# Patient Record
Sex: MALE | Race: WHITE | Employment: OTHER | URBAN - METROPOLITAN AREA
[De-identification: names, ages, dates, MRNs, and addresses within clinical notes are randomized per-mention and may not be internally consistent; named-entity substitution may affect disease eponyms.]

---

## 2019-03-28 ENCOUNTER — TRANSFERRED RECORDS (OUTPATIENT)
Dept: HEALTH INFORMATION MANAGEMENT | Facility: CLINIC | Age: 84
End: 2019-03-28

## 2019-04-04 ENCOUNTER — TELEPHONE (OUTPATIENT)
Dept: OTHER | Facility: CLINIC | Age: 84
End: 2019-04-04

## 2019-04-04 DIAGNOSIS — R09.89 DIMINISHED PULSES IN LOWER EXTREMITY: Primary | ICD-10-CM

## 2019-04-04 NOTE — TELEPHONE ENCOUNTER
Reason for call:  Symptom   Symptom or request: Referral    Duration (how long have symptoms been present): several months  Have you been treated for this before? Yes    Additional comments: Patient called to schedule a consult with Dr. Marie before surgery on his legs at [facility] to evaluate blood flow.    Phone number to reach patient:  Home number on file [phone] (home)    Best Time:  Any    Can we leave a detailed message on this number?  YES

## 2019-04-05 NOTE — TELEPHONE ENCOUNTER
Contacted pt to discuss further.  Pt is seeing Dr. Rodriguez at [facility] for ankle pain.  Pt denies open wounds, calf pain with ambulation.  Will discuss with Dr. Marie if he would like KANG prior to consult or assess clinically with doppler.  Requested recent office notes from Dr. Rodriguez at [facility].  Clementine Bazan, ELAINAN, RN

## 2019-04-09 NOTE — TELEPHONE ENCOUNTER
Office notes received and reviewed with Dr. Marie.  He would like pt to obtain KANG w/ exercise prior to consult on 4/18/19.  Order entered.  Will route to scheduling to coordinate.  ELAINA LopezN, RN

## 2019-04-18 ENCOUNTER — OFFICE VISIT (OUTPATIENT)
Dept: OTHER | Facility: CLINIC | Age: 84
End: 2019-04-18
Attending: SURGERY
Payer: MEDICARE

## 2019-04-18 ENCOUNTER — HOSPITAL ENCOUNTER (OUTPATIENT)
Dept: ULTRASOUND IMAGING | Facility: CLINIC | Age: 84
Discharge: HOME | End: 2019-04-18
Attending: SURGERY | Admitting: SURGERY
Payer: MEDICARE

## 2019-04-18 VITALS
HEIGHT: 72 IN | BODY MASS INDEX: 23.7 KG/M2 | HEART RATE: 55 BPM | SYSTOLIC BLOOD PRESSURE: 128 MMHG | OXYGEN SATURATION: 95 % | RESPIRATION RATE: 16 BRPM | WEIGHT: 175 LBS | DIASTOLIC BLOOD PRESSURE: 78 MMHG

## 2019-04-18 DIAGNOSIS — R09.89 DIMINISHED PULSES IN LOWER EXTREMITY: ICD-10-CM

## 2019-04-18 DIAGNOSIS — I73.9 PAD (PERIPHERAL ARTERY DISEASE) (H): Primary | ICD-10-CM

## 2019-04-18 DIAGNOSIS — I65.23 CAROTID STENOSIS, ASYMPTOMATIC, BILATERAL: ICD-10-CM

## 2019-04-18 PROCEDURE — 99203 OFFICE O/P NEW LOW 30 MIN: CPT | Mod: ZP | Performed by: SURGERY

## 2019-04-18 PROCEDURE — 93924 LWR XTR VASC STDY BILAT: CPT

## 2019-04-18 PROCEDURE — G0463 HOSPITAL OUTPT CLINIC VISIT: HCPCS | Mod: 25

## 2019-04-18 RX ORDER — CARVEDILOL 6.25 MG/1
6.25 TABLET ORAL 2 TIMES DAILY
COMMUNITY
Start: 2012-11-08

## 2019-04-18 RX ORDER — LISINOPRIL 2.5 MG/1
2.5 TABLET ORAL DAILY
Refills: 3 | COMMUNITY
Start: 2019-03-12

## 2019-04-18 RX ORDER — AMMONIUM LACTATE 12 G/100G
CREAM TOPICAL 2 TIMES DAILY
COMMUNITY

## 2019-04-18 SDOH — HEALTH STABILITY: MENTAL HEALTH: HOW OFTEN DO YOU HAVE A DRINK CONTAINING ALCOHOL?: NEVER

## 2019-04-18 ASSESSMENT — MIFFLIN-ST. JEOR: SCORE: 1531.79

## 2019-04-18 NOTE — PROGRESS NOTES
Anthony Mason is a 83 year old male who presents for:  Chief Complaint   Patient presents with     RECHECK     KANG w/ (10:30VHC, 11:00 WRO)New consult for ankle pain, ref by Dr. Rodriguez.        Vitals:    Vitals:    04/18/19 1127   BP: 128/78   BP Location: Left arm   Patient Position: Chair   Cuff Size: Adult Regular   Pulse: 55   Resp: 16   SpO2: 95%   Weight: 175 lb (79.4 kg)   Height: 6' (1.829 m)       BMI:  Estimated body mass index is 23.73 kg/m  as calculated from the following:    Height as of this encounter: 6' (1.829 m).    Weight as of this encounter: 175 lb (79.4 kg).    Pain Score:  Data Unavailable        Mili Bartholomew

## 2019-04-18 NOTE — LETTER
[facility] [address]  [place] [address]  Phone: [phone]  Fax: [phone]      2019       Re: Anthony Mason - 1935    Anthony Mason was referred to see us by Dr. Mathew Rodriguez of [facility] for bilateral foot pain.  Been followed in the past for plantar fasciitis-corns-calluses.  He does wear insoles.  He was noted on Dr. dickson since last exam on 3/28/2019 to have decreased distal pulses.  He had no ulcerations on that exam.  Comes for a vascular evaluation.     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 Work-up was performed.  Gradual improvement with essentially complete recovery.  Currently not related to carotid disease.  2012 carotid CTA revealed mild disease in the right and less than 50% on the left.  No follow-up since.     Prostate cancer treated with open prostatectomy and doing well  Severe bradycardia requiring pacemaker placement and subsequent pacemaker change.  Obstructive sleep apnea using CPAP at home  Well-controlled hypertension on medications  Prior appendectomy, knee surgery, herniorrhaphy     Non-smoker.  No present use of alcohol.     Social history: Retired .  Lives independent with his wife.  Remains active.     FMH: History of cancer and cardiac disease     Exam: Patient is alert and appropriate.  Very pleasant affect.             Blood pressure 128/78.  Pulse 55 regular.  Left-sided pacemaker             HEENT= beard.  No carotid bruits.             Chest= clear to auscultation             Cardiovascular= regular rate.             Abdomen= fairly thin.  Well-healed infraumbilical midline incision from prostate             Surgery.  No palpable AAA.             Extremities= bluish discoloration of his toes.  No edema.  No ulcerations.             +3 palpable popliteal and  posterior tibial pulses but no dorsalis pedis pulse.             Normal 5.07 NSW probe sensation to both feet.             Significant calluses today.      Ankle-brachial index is normal at 1.41 on the right and 1.21 on the left with triphasic waveforms in all arteries including the dorsalis pedis artery.  Both indices increased with exercise implying very adequate blood flow.        Impression: #1.  No evidence of significant PAD.  The dorsalis pedis arteries are difficult to palpate the abnormal waveforms and pressures on the KANG testing with exercise.                         #2.  Chronic bluish discoloration of his toes most likely due to venous congestion. May also have mild Raynaud's disease.  Discussed with patient.  No specific treatment except keeping his feet warm particularly in the cold months.                           #3.  History of CVI with complete recovery.  Symptoms and etiology are unclear from his description.  Noted to have mild to moderate left carotid disease in 2012.  I would recommend he has a repeat carotid duplex ultrasound.  He gets his care regularly at the [facility] and has an appointment in the next several months I did recommend that he have that done at that time.     No specific vascular follow-up is indicated.     Enoch Marie MD

## 2019-04-18 NOTE — PROGRESS NOTES
[facility]    Anthony Mason was referred to see us by Dr. Mathew Rodriguez of [facility] for bilateral foot pain.  Been followed in the past for plantar fasciitis-corns-calluses.  He does wear insoles.  He was noted on Dr. dickson since last exam on 3/28/2019 to have decreased distal pulses.  He had no ulcerations on that exam.  Comes for a vascular evaluation.    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  Work-up was performed.  Gradual improvement with essentially complete recovery.  Currently not related to carotid disease.  6/22/2012 carotid CTA revealed mild disease in the right and less than 50% on the left.  No follow-up since.                           Prostate cancer treated with open prostatectomy and doing well                         Severe bradycardia requiring pacemaker placement and subsequent pacemaker change.                           Obstructive sleep apnea using CPAP at home                          Well-controlled hypertension on medications                           Prior appendectomy, knee surgery, herniorrhaphy              Non-smoker.  No present use of alcohol.    Social history: Retired .  Lives independent with his wife.  Remains active.    FMH: History of cancer and cardiac disease    Exam: Patient is alert and appropriate.  Very pleasant affect.              Blood pressure 128/78.  Pulse 55 regular.  Left-sided pacemaker              HEENT= beard.  No carotid bruits.              Chest= clear to auscultation               Cardiovascular= regular rate.               Abdomen= fairly thin.  Well-healed infraumbilical midline incision from prostate                     Surgery.  No palpable AAA.               Extremities= bluish discoloration of his toes.  No edema.  No ulcerations.                     +3  palpable popliteal and posterior tibial pulses but no dorsalis pedis pulse.                     Normal 5.07 NSW probe sensation to both feet.                     Significant calluses today.      Ankle-brachial index is normal at 1.41 on the right and 1.21 on the left with triphasic waveforms in all arteries including the dorsalis pedis artery.  Both indices increased with exercise implying very adequate blood flow.      Impression: #1.  No evidence of significant PAD.  The dorsalis pedis arteries are difficult to palpate the abnormal waveforms and pressures on the KANG testing with exercise.                        #2.  Chronic bluish discoloration of his toes most likely due to venous congestion.  May also have mild Raynaud's disease.  Discussed with patient.  No specific treatment except keeping his feet warm particularly in the cold months.                          #3.  History of CVI with complete recovery.  Symptoms and etiology are unclear from his description.  Noted to have mild to moderate left carotid disease in 2012.  I would recommend he has a repeat carotid duplex ultrasound.  He gets his care regularly at the [facility] and has an appointment in the next several months I did recommend that he have that done at that time.    Spent over 20 minutes with the patient today with over 50% counseling.  No specific vascular follow-up is indicated.      Enoch Marie MD     CC;  Dr Mathew MIDDLETONO

## 2021-05-30 ENCOUNTER — RECORDS - HEALTHEAST (OUTPATIENT)
Dept: ADMINISTRATIVE | Facility: CLINIC | Age: 86
End: 2021-05-30